# Patient Record
Sex: FEMALE | Race: WHITE | NOT HISPANIC OR LATINO | Employment: FULL TIME | URBAN - METROPOLITAN AREA
[De-identification: names, ages, dates, MRNs, and addresses within clinical notes are randomized per-mention and may not be internally consistent; named-entity substitution may affect disease eponyms.]

---

## 2018-11-28 ENCOUNTER — TELEPHONE (OUTPATIENT)
Dept: GASTROENTEROLOGY | Facility: CLINIC | Age: 23
End: 2018-11-28

## 2018-11-28 NOTE — TELEPHONE ENCOUNTER
----- Message from Lexie Maddox sent at 11/28/2018  4:13 PM CST -----  Contact: [facility] Coleman Aguilar and staff,     Dr Nahum Hartman is referreing this pt to see you for a dx of Crohns    The refrl and records are in media mgr.     Can you plz review and ctc the pt to schedule?     Thank you,  Lexie Maddox   [facility]    [phone]

## 2018-11-30 ENCOUNTER — TELEPHONE (OUTPATIENT)
Dept: GASTROENTEROLOGY | Facility: CLINIC | Age: 23
End: 2018-11-30

## 2018-12-04 ENCOUNTER — TELEPHONE (OUTPATIENT)
Dept: GASTROENTEROLOGY | Facility: CLINIC | Age: 23
End: 2018-12-04

## 2018-12-04 NOTE — TELEPHONE ENCOUNTER
----- Message from Promise Robbins MA sent at 12/4/2018  1:41 PM CST -----  Contact: [phone]  Needs Advice    Reason for call: Needing to reschedule her appt on 12/11/18        Communication Preference: [phone]    Additional Information: please call
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
## 2019-01-17 ENCOUNTER — LAB VISIT (OUTPATIENT)
Dept: LAB | Facility: HOSPITAL | Age: 24
End: 2019-01-17
Attending: INTERNAL MEDICINE
Payer: COMMERCIAL

## 2019-01-17 ENCOUNTER — OFFICE VISIT (OUTPATIENT)
Dept: GASTROENTEROLOGY | Facility: CLINIC | Age: 24
End: 2019-01-17
Payer: COMMERCIAL

## 2019-01-17 ENCOUNTER — TELEPHONE (OUTPATIENT)
Dept: ENDOSCOPY | Facility: HOSPITAL | Age: 24
End: 2019-01-17

## 2019-01-17 VITALS
WEIGHT: 139.56 LBS | HEART RATE: 72 BPM | TEMPERATURE: 98 F | DIASTOLIC BLOOD PRESSURE: 76 MMHG | HEIGHT: 66 IN | BODY MASS INDEX: 22.43 KG/M2 | SYSTOLIC BLOOD PRESSURE: 115 MMHG

## 2019-01-17 DIAGNOSIS — K50.119 CROHN'S DISEASE OF COLON WITH COMPLICATION: Primary | ICD-10-CM

## 2019-01-17 DIAGNOSIS — K50.119 CROHN'S DISEASE OF COLON WITH COMPLICATION: ICD-10-CM

## 2019-01-17 DIAGNOSIS — Z12.11 SPECIAL SCREENING FOR MALIGNANT NEOPLASMS, COLON: Primary | ICD-10-CM

## 2019-01-17 LAB
25(OH)D3+25(OH)D2 SERPL-MCNC: 23 NG/ML
ALBUMIN SERPL BCP-MCNC: 4.3 G/DL
ALP SERPL-CCNC: 66 U/L
ALT SERPL W/O P-5'-P-CCNC: 16 U/L
ANION GAP SERPL CALC-SCNC: 8 MMOL/L
AST SERPL-CCNC: 20 U/L
BASOPHILS # BLD AUTO: 0.04 K/UL
BASOPHILS NFR BLD: 0.5 %
BILIRUB SERPL-MCNC: 0.2 MG/DL
BUN SERPL-MCNC: 16 MG/DL
CALCIUM SERPL-MCNC: 9.5 MG/DL
CHLORIDE SERPL-SCNC: 104 MMOL/L
CO2 SERPL-SCNC: 27 MMOL/L
CREAT SERPL-MCNC: 0.8 MG/DL
CRP SERPL-MCNC: 2.2 MG/L
DIFFERENTIAL METHOD: ABNORMAL
EOSINOPHIL # BLD AUTO: 0.1 K/UL
EOSINOPHIL NFR BLD: 1.6 %
ERYTHROCYTE [DISTWIDTH] IN BLOOD BY AUTOMATED COUNT: 12.8 %
ERYTHROCYTE [SEDIMENTATION RATE] IN BLOOD BY WESTERGREN METHOD: <2 MM/HR
EST. GFR  (AFRICAN AMERICAN): >60 ML/MIN/1.73 M^2
EST. GFR  (NON AFRICAN AMERICAN): >60 ML/MIN/1.73 M^2
FERRITIN SERPL-MCNC: 75 NG/ML
GLUCOSE SERPL-MCNC: 72 MG/DL
HCT VFR BLD AUTO: 45 %
HGB BLD-MCNC: 14.8 G/DL
IGA SERPL-MCNC: 100 MG/DL
IMM GRANULOCYTES # BLD AUTO: 0.02 K/UL
IMM GRANULOCYTES NFR BLD AUTO: 0.3 %
IRON SERPL-MCNC: 104 UG/DL
LYMPHOCYTES # BLD AUTO: 2.8 K/UL
LYMPHOCYTES NFR BLD: 36.8 %
MCH RBC QN AUTO: 31.9 PG
MCHC RBC AUTO-ENTMCNC: 32.9 G/DL
MCV RBC AUTO: 97 FL
MONOCYTES # BLD AUTO: 0.5 K/UL
MONOCYTES NFR BLD: 6.7 %
NEUTROPHILS # BLD AUTO: 4.1 K/UL
NEUTROPHILS NFR BLD: 54.1 %
NRBC BLD-RTO: 0 /100 WBC
PLATELET # BLD AUTO: 310 K/UL
PMV BLD AUTO: 9.8 FL
POTASSIUM SERPL-SCNC: 3.8 MMOL/L
PROT SERPL-MCNC: 8.1 G/DL
RBC # BLD AUTO: 4.64 M/UL
SATURATED IRON: 26 %
SODIUM SERPL-SCNC: 139 MMOL/L
TOTAL IRON BINDING CAPACITY: 398 UG/DL
TRANSFERRIN SERPL-MCNC: 269 MG/DL
VIT B12 SERPL-MCNC: 577 PG/ML
WBC # BLD AUTO: 7.59 K/UL

## 2019-01-17 PROCEDURE — 86803 HEPATITIS C AB TEST: CPT

## 2019-01-17 PROCEDURE — 99999 PR PBB SHADOW E&M-EST. PATIENT-LVL IV: ICD-10-PCS | Mod: PBBFAC,,, | Performed by: INTERNAL MEDICINE

## 2019-01-17 PROCEDURE — 82607 VITAMIN B-12: CPT

## 2019-01-17 PROCEDURE — 86704 HEP B CORE ANTIBODY TOTAL: CPT

## 2019-01-17 PROCEDURE — 85652 RBC SED RATE AUTOMATED: CPT

## 2019-01-17 PROCEDURE — 82784 ASSAY IGA/IGD/IGG/IGM EACH: CPT

## 2019-01-17 PROCEDURE — 82657 ENZYME CELL ACTIVITY: CPT

## 2019-01-17 PROCEDURE — 87340 HEPATITIS B SURFACE AG IA: CPT

## 2019-01-17 PROCEDURE — 99999 PR PBB SHADOW E&M-EST. PATIENT-LVL IV: CPT | Mod: PBBFAC,,, | Performed by: INTERNAL MEDICINE

## 2019-01-17 PROCEDURE — 82728 ASSAY OF FERRITIN: CPT

## 2019-01-17 PROCEDURE — 86140 C-REACTIVE PROTEIN: CPT

## 2019-01-17 PROCEDURE — 99245 OFF/OP CONSLTJ NEW/EST HI 55: CPT | Mod: S$GLB,,, | Performed by: INTERNAL MEDICINE

## 2019-01-17 PROCEDURE — 86706 HEP B SURFACE ANTIBODY: CPT

## 2019-01-17 PROCEDURE — 83516 IMMUNOASSAY NONANTIBODY: CPT

## 2019-01-17 PROCEDURE — 36415 COLL VENOUS BLD VENIPUNCTURE: CPT

## 2019-01-17 PROCEDURE — 80053 COMPREHEN METABOLIC PANEL: CPT

## 2019-01-17 PROCEDURE — 85025 COMPLETE CBC W/AUTO DIFF WBC: CPT

## 2019-01-17 PROCEDURE — 99245 PR OFFICE CONSULTATION,LEVEL V: ICD-10-PCS | Mod: S$GLB,,, | Performed by: INTERNAL MEDICINE

## 2019-01-17 PROCEDURE — 86787 VARICELLA-ZOSTER ANTIBODY: CPT

## 2019-01-17 PROCEDURE — 86790 VIRUS ANTIBODY NOS: CPT

## 2019-01-17 PROCEDURE — 83540 ASSAY OF IRON: CPT

## 2019-01-17 PROCEDURE — 86703 HIV-1/HIV-2 1 RESULT ANTBDY: CPT

## 2019-01-17 PROCEDURE — 82306 VITAMIN D 25 HYDROXY: CPT

## 2019-01-17 RX ORDER — POLYETHYLENE GLYCOL 3350, SODIUM SULFATE ANHYDROUS, SODIUM BICARBONATE, SODIUM CHLORIDE, POTASSIUM CHLORIDE 236; 22.74; 6.74; 5.86; 2.97 G/4L; G/4L; G/4L; G/4L; G/4L
4 POWDER, FOR SOLUTION ORAL ONCE
Qty: 4000 ML | Refills: 0 | Status: SHIPPED | OUTPATIENT
Start: 2019-01-17 | End: 2019-01-17

## 2019-01-17 RX ORDER — FOLIC ACID 1 MG/1
1 TABLET ORAL DAILY
COMMUNITY

## 2019-01-17 RX ORDER — USTEKINUMAB 90 MG/ML
90 INJECTION, SOLUTION SUBCUTANEOUS
COMMUNITY

## 2019-01-17 RX ORDER — RIBOFLAVIN (VITAMIN B2) 25 MG
400 TABLET ORAL DAILY
COMMUNITY

## 2019-01-17 RX ORDER — AMOXICILLIN 500 MG
2 CAPSULE ORAL DAILY
COMMUNITY

## 2019-01-17 RX ORDER — NORETHINDRONE ACETATE AND ETHINYL ESTRADIOL 1MG-20(21)
1 KIT ORAL DAILY
COMMUNITY

## 2019-01-17 RX ORDER — FERROUS SULFATE 325(65) MG
325 TABLET ORAL
COMMUNITY

## 2019-01-17 RX ORDER — FLUOXETINE 10 MG/1
10 CAPSULE ORAL DAILY
COMMUNITY

## 2019-01-17 NOTE — PATIENT INSTRUCTIONS
Tests ordered during this visit:  Stool studies and Cdiff testing  Stool calprotectin  Routine blood tests including CBC, CMP and CRP, ESR  Blood test to check vitamin deficiencies.   Labs can be done at any [facility] facility  Colonoscopy to assess disease activity - can be done with primary GI    Medication changes:  No changes to your current medications. Please continue medications as previously instructed.     Preventative testing/exams  Recommend yearly skin exams by dermatology (increased risk for skin cancers)  Recommend yearly eye exam  Recommend yearly well women exams and PAP smear    Vaccinations:  Please send us an update and if able to, provide a copy of your previous vaccination records.  Recommended vaccinations if not done yet.   HPV  Tdap  Influenza  Pneumonia  Hepatitis A/B     Follow up visit:  Follow up in a week from now after colonoscopy - same day    Email/call IBD clinic:  Email/call IBD in clinic if worsening symptoms.     Methotrexate:  - methotrexate can decrease your folic acid--take folic acid 1 mg daily  - need to order Fibroscan of 2 gm of MTX--approximately 2 years after start    Common SE:   ?Gastrointestinal problems, such as nausea, stomach upset, and loose stools  ?Stomatitis or soreness of the mouth  ?Abnormal liver chemistries   ?Kidney problems  ?Rash- photosensivity- wear sunblock  ?Headache, fatigue, or impaired ability to concentrate  ?Hair loss- rare  ?Fever, which is drug-related  ?Hematologic abnormalities- abnormal blood counts

## 2019-01-17 NOTE — LETTER
January 18, 2019      Nahum Hartman MD  [address] [place] [address] [address]  [facility] [address]           [facility] Gastroenterology  [address]  [place] [address]  Phone: [phone]  Fax: [phone]          Patient: Rachelle Bunch   MR Number: [id number]   YOB: 1995   Date of Visit: 1/17/2019       Dear Dr. Nahum Hartman:    Thank you for referring Rachelle Bunch to me for evaluation. Attached you will find relevant portions of my assessment and plan of care.    If you have questions, please do not hesitate to call me. I look forward to following Rachelle Bunch along with you.    Sincerely,    Galo Aguilar MD    Enclosure  CC:  No Recipients    If you would like to receive this communication electronically, please contact [email or web address] or [phone] to request more information on [facility] access.    For providers and/or their staff who would like to refer a patient to [facility], please contact us through our one-stop-shop provider referral line, [facility], at [phone].    If you feel you have received this communication in error or would no longer like to receive these types of communications, please e-mail [email or web address]

## 2019-01-18 PROBLEM — K50.119 CROHN'S DISEASE OF COLON WITH COMPLICATION: Status: ACTIVE | Noted: 2019-01-18

## 2019-01-18 LAB
HBV CORE AB SERPL QL IA: NEGATIVE
HBV SURFACE AB SER-ACNC: NEGATIVE M[IU]/ML
HBV SURFACE AG SERPL QL IA: NEGATIVE
HCV AB SERPL QL IA: NEGATIVE
HEPATITIS A ANTIBODY, IGG: POSITIVE
HIV 1+2 AB+HIV1 P24 AG SERPL QL IA: NEGATIVE

## 2019-01-19 LAB
VARICELLA INTERPRETATION: POSITIVE
VARICELLA ZOSTER IGG: 3.75 ISR

## 2019-01-21 LAB — TTG IGA SER-ACNC: 4 UNITS

## 2019-01-24 ENCOUNTER — HOSPITAL ENCOUNTER (OUTPATIENT)
Facility: HOSPITAL | Age: 24
Discharge: HOME | End: 2019-01-24
Attending: INTERNAL MEDICINE | Admitting: INTERNAL MEDICINE
Payer: COMMERCIAL

## 2019-01-24 ENCOUNTER — ANESTHESIA EVENT (OUTPATIENT)
Dept: ENDOSCOPY | Facility: HOSPITAL | Age: 24
End: 2019-01-24
Payer: COMMERCIAL

## 2019-01-24 ENCOUNTER — ANESTHESIA (OUTPATIENT)
Dept: ENDOSCOPY | Facility: HOSPITAL | Age: 24
End: 2019-01-24
Payer: COMMERCIAL

## 2019-01-24 VITALS
RESPIRATION RATE: 16 BRPM | SYSTOLIC BLOOD PRESSURE: 103 MMHG | DIASTOLIC BLOOD PRESSURE: 67 MMHG | BODY MASS INDEX: 20.89 KG/M2 | OXYGEN SATURATION: 100 % | TEMPERATURE: 98 F | HEART RATE: 76 BPM | HEIGHT: 66 IN | WEIGHT: 130 LBS

## 2019-01-24 DIAGNOSIS — K50.119 CROHN'S DISEASE OF COLON WITH COMPLICATION: Primary | ICD-10-CM

## 2019-01-24 DIAGNOSIS — K50.111 CROHN'S DISEASE OF COLON WITH RECTAL BLEEDING: Primary | ICD-10-CM

## 2019-01-24 DIAGNOSIS — K50.10 CROHN'S DISEASE, COLON: ICD-10-CM

## 2019-01-24 LAB
6-METHYLMERCAPTOPURINE RIBOSIDE: 6.14 NMOL/ML/H (ref 5.04–9.57)
6-METHYLMERCAPTOPURINE: 3.2 NMOL/ML/H (ref 3–6.66)
6-METHYLTHIOGUANINE RIBOSIDE: 3.97 NMOL/ML/H (ref 2.7–5.84)
B-HCG UR QL: NEGATIVE
CTP QC/QA: YES
TPMT INTERPRETATION: NORMAL
TPMT REVIEWED BY: NORMAL

## 2019-01-24 PROCEDURE — 37000009 HC ANESTHESIA EA ADD 15 MINS: Performed by: INTERNAL MEDICINE

## 2019-01-24 PROCEDURE — 81025 URINE PREGNANCY TEST: CPT | Performed by: INTERNAL MEDICINE

## 2019-01-24 PROCEDURE — 37000008 HC ANESTHESIA 1ST 15 MINUTES: Performed by: INTERNAL MEDICINE

## 2019-01-24 PROCEDURE — 25000003 PHARM REV CODE 250: Performed by: INTERNAL MEDICINE

## 2019-01-24 PROCEDURE — 88305 TISSUE SPECIMEN TO PATHOLOGY - SURGERY: ICD-10-PCS | Mod: 26,,, | Performed by: PATHOLOGY

## 2019-01-24 PROCEDURE — E9220 PRA ENDO ANESTHESIA: ICD-10-PCS | Mod: ,,, | Performed by: NURSE ANESTHETIST, CERTIFIED REGISTERED

## 2019-01-24 PROCEDURE — 45380 COLONOSCOPY AND BIOPSY: CPT | Performed by: INTERNAL MEDICINE

## 2019-01-24 PROCEDURE — 27201012 HC FORCEPS, HOT/COLD, DISP: Performed by: INTERNAL MEDICINE

## 2019-01-24 PROCEDURE — 45380 COLONOSCOPY AND BIOPSY: CPT | Mod: ,,, | Performed by: INTERNAL MEDICINE

## 2019-01-24 PROCEDURE — 88305 TISSUE EXAM BY PATHOLOGIST: CPT | Mod: 26,,, | Performed by: PATHOLOGY

## 2019-01-24 PROCEDURE — 63600175 PHARM REV CODE 636 W HCPCS: Performed by: NURSE ANESTHETIST, CERTIFIED REGISTERED

## 2019-01-24 PROCEDURE — 45380 PR COLONOSCOPY,BIOPSY: ICD-10-PCS | Mod: ,,, | Performed by: INTERNAL MEDICINE

## 2019-01-24 PROCEDURE — 88305 TISSUE EXAM BY PATHOLOGIST: CPT | Performed by: PATHOLOGY

## 2019-01-24 PROCEDURE — E9220 PRA ENDO ANESTHESIA: HCPCS | Mod: ,,, | Performed by: NURSE ANESTHETIST, CERTIFIED REGISTERED

## 2019-01-24 RX ORDER — LIDOCAINE HCL/PF 100 MG/5ML
SYRINGE (ML) INTRAVENOUS
Status: DISCONTINUED | OUTPATIENT
Start: 2019-01-24 | End: 2019-01-24

## 2019-01-24 RX ORDER — SODIUM CHLORIDE 0.9 % (FLUSH) 0.9 %
3 SYRINGE (ML) INJECTION
Status: DISCONTINUED | OUTPATIENT
Start: 2019-01-24 | End: 2019-01-24 | Stop reason: HOSPADM

## 2019-01-24 RX ORDER — SODIUM CHLORIDE 9 MG/ML
INJECTION, SOLUTION INTRAVENOUS CONTINUOUS
Status: DISCONTINUED | OUTPATIENT
Start: 2019-01-24 | End: 2019-01-24 | Stop reason: HOSPADM

## 2019-01-24 RX ORDER — PROPOFOL 10 MG/ML
VIAL (ML) INTRAVENOUS CONTINUOUS PRN
Status: DISCONTINUED | OUTPATIENT
Start: 2019-01-24 | End: 2019-01-24

## 2019-01-24 RX ORDER — MESALAMINE 1000 MG/1
1000 SUPPOSITORY RECTAL NIGHTLY
Qty: 30 SUPPOSITORY | Refills: 5 | Status: SHIPPED | OUTPATIENT
Start: 2019-01-24 | End: 2019-01-29

## 2019-01-24 RX ORDER — PROPOFOL 10 MG/ML
VIAL (ML) INTRAVENOUS
Status: DISCONTINUED | OUTPATIENT
Start: 2019-01-24 | End: 2019-01-24

## 2019-01-24 RX ADMIN — SODIUM CHLORIDE: 0.9 INJECTION, SOLUTION INTRAVENOUS at 07:01

## 2019-01-24 RX ADMIN — PROPOFOL 150 MCG/KG/MIN: 10 INJECTION, EMULSION INTRAVENOUS at 07:01

## 2019-01-24 RX ADMIN — LIDOCAINE HYDROCHLORIDE 40 MG: 20 INJECTION, SOLUTION INTRAVENOUS at 07:01

## 2019-01-24 RX ADMIN — PROPOFOL 50 MG: 10 INJECTION, EMULSION INTRAVENOUS at 08:01

## 2019-01-24 RX ADMIN — PROPOFOL 100 MG: 10 INJECTION, EMULSION INTRAVENOUS at 07:01

## 2019-01-24 RX ADMIN — PROPOFOL 50 MG: 10 INJECTION, EMULSION INTRAVENOUS at 07:01

## 2019-01-24 NOTE — DISCHARGE INSTRUCTIONS
Colonoscopy     A camera attached to a flexible tube with a viewing lens is used to take video pictures.     Colonoscopy is a test to view the inside of your lower digestive tract (colon and rectum). Sometimes it can show the last part of the small intestine (ileum). During the test, small pieces of tissue may be removed for testing. This is called a biopsy. Small growths, such as polyps, may also be removed.   Why is colonoscopy done?  The test is done to help look for colon cancer. And it can help find the source of abdominal pain, bleeding, and changes in bowel habits. It may be needed once a year, depending on factors such as your:  · Age  · Health history  · Family health history  · Symptoms  · Results from any prior colonoscopy  Risks and possible complications  These include:  · Bleeding               · A puncture or tear in the colon   · Risks of anesthesia  · A cancer lesion not being seen  Getting ready   To prepare for the test:  · Talk with your healthcare provider about the risks of the test (see below). Also ask your healthcare provider about alternatives to the test.  · Tell your healthcare provider about any medicines you take. Also tell him or her about any health conditions you may have.  · Make sure your rectum and colon are empty for the test. Follow the diet and bowel prep instructions exactly. If you dont, the test may need to be rescheduled.  · Plan for a friend or family member to drive you home after the test.     Colonoscopy provides an inside view of the entire colon.     You may discuss the results with your doctor right away or at a future visit.  During the test   The test is usually done in the hospital on an outpatient basis. This means you go home the same day. The procedure takes about 30 minutes. During that time:  · You are given relaxing (sedating) medicine through an IV line. You may be drowsy, or fully asleep.  · The healthcare provider will first give you a physical exam to  check for anal and rectal problems.  · Then the anus is lubricated and the scope inserted.  · If you are awake, you may have a feeling similar to needing to have a bowel movement. You may also feel pressure as air is pumped into the colon. Its OK to pass gas during the procedure.  · Biopsy, polyp removal, or other treatments may be done during the test.  After the test   You may have gas right after the test. It can help to try to pass it to help prevent later bloating. Your healthcare provider may discuss the results with you right away. Or you may need to schedule a follow-up visit to talk about the results. After the test, you can go back to your normal eating and other activities. You may be tired from the sedation and need to rest for a few hours.  Date Last Reviewed: 11/1/2016 © 2000-2017 [employer]. [address] [place] [address], [place] [address]. All rights reserved. This information is not intended as a substitute for professional medical care. Always follow your healthcare professional's instructions.

## 2019-01-24 NOTE — ANESTHESIA POSTPROCEDURE EVALUATION
"Anesthesia Post Evaluation    Patient: Rachelle Bunch    Procedure(s) Performed: Procedure(s) (LRB):  COLONOSCOPY (N/A)    Final Anesthesia Type: general  Patient location during evaluation: PACU  Patient participation: Yes- Able to Participate  Level of consciousness: awake and alert and oriented  Post-procedure vital signs: reviewed and stable  Pain management: adequate  Airway patency: patent  PONV status at discharge: No PONV  Anesthetic complications: no      Cardiovascular status: hemodynamically stable  Respiratory status: unassisted  Hydration status: euvolemic  Follow-up not needed.        Visit Vitals  BP 97/60 (BP Location: Left arm, Patient Position: Lying)   Pulse 67   Temp 36.7 °C (98.1 °F) (Temporal)   Resp 16   Ht 5' 6" (1.676 m)   Wt 59 kg (130 lb)   SpO2 100%   Breastfeeding? No   BMI 20.98 kg/m²       Pain/Lora Score: Lora Score: 9 (1/24/2019  8:41 AM)        "

## 2019-01-24 NOTE — PLAN OF CARE
Discharge instruction given to patient and family. Patient and family verbalized understanding. All questions and concerns addressed.

## 2019-01-24 NOTE — [employer] PATIENT INSTRUCTIONS
Discharge Summary/Instructions after an Endoscopic Procedure  Patient Name: Rachelle Bunch  Patient MRN: [id number]  Patient YOB: 1995 Thursday, January 24, 2019  Galo Aguilar MD  RESTRICTIONS:  During your procedure today, you received medications for sedation.  These   medications may affect your judgment, balance and coordination.  Therefore,   for 24 hours, you have the following restrictions:   - DO NOT drive a car, operate machinery, make legal/financial decisions,   sign important papers or drink alcohol.    ACTIVITY:  Today: no heavy lifting, straining or running due to procedural   sedation/anesthesia.  The following day: return to full activity including work.  DIET:  Eat and drink normally unless instructed otherwise.     TREATMENT FOR COMMON SIDE EFFECTS:  - Mild abdominal pain, nausea, belching, bloating or excessive gas:  rest,   eat lightly and use a heating pad.  - Sore Throat: treat with throat lozenges and/or gargle with warm salt   water.  - Because air was used during the procedure, expelling large amounts of air   from your rectum or belching is normal.  - If a bowel prep was taken, you may not have a bowel movement for 1-3 days.    This is normal.  SYMPTOMS TO WATCH FOR AND REPORT TO YOUR PHYSICIAN:  1. Abdominal pain or bloating, other than gas cramps.  2. Chest pain.  3. Back pain.  4. Signs of infection such as: chills or fever occurring within 24 hours   after the procedure.  5. Rectal bleeding, which would show as bright red, maroon, or black stools.   (A tablespoon of blood from the rectum is not serious, especially if   hemorrhoids are present.)  6. Vomiting.  7. Weakness or dizziness.  GO DIRECTLY TO THE NEAREST EMERGENCY ROOM IF YOU HAVE ANY OF THE FOLLOWING:      Difficulty breathing              Chills and/or fever over 101 F   Persistent vomiting and/or vomiting blood   Severe abdominal pain   Severe chest pain   Black, tarry stools   Bleeding- more than one  tablespoon   Any other symptom or condition that you feel may need urgent attention  Your doctor recommends these additional instructions:  If any biopsies were taken, your doctors clinic will contact you in 1 to 2   weeks with any results.  - Discharge patient to home.   - Patient has a contact number available for emergencies.  The signs and   symptoms of potential delayed complications were discussed with the   patient.  Return to normal activities tomorrow.  Written discharge   instructions were provided to the patient.   - Resume previous diet.   - Continue present medications.   - Await pathology results.   - Return to referring physician.   - Start canasa suppositories per rectum at bedtime.   - Repeat colonoscopy to be scheduled.   For questions, problems or results please call your physician - Galo Aguilar MD at Work:  [phone].  [facility], EMERGENCY ROOM PHONE NUMBER: [phone]  IF A COMPLICATION OR EMERGENCY SITUATION ARISES AND YOU ARE UNABLE TO REACH   YOUR PHYSICIAN - GO DIRECTLY TO THE EMERGENCY ROOM.  Galo Aguilar MD  1/24/2019 8:19:14 AM  This report has been verified and signed electronically.  [employer]

## 2019-01-24 NOTE — ANESTHESIA PREPROCEDURE EVALUATION
01/24/2019  Rachelle Bunch is a 23 y.o., female.    Past Medical History:   Diagnosis Date    Anemia 2015    It should be better now.    Crohn's disease     Duane syndrome of right eye     Migraine     Plantar callosity      Past Surgical History:   Procedure Laterality Date    COLONOSCOPY      NASAL SEPTUM SURGERY      TOE SURGERY      ingrown toenail procedure done under anesthesia    TONSILLECTOMY, ADENOIDECTOMY      UPPER GASTROINTESTINAL ENDOSCOPY      WISDOM TOOTH EXTRACTION         Anesthesia Evaluation     I have reviewed the Nursing Notes.   I have reviewed the Medications.     Review of Systems  Anesthesia Hx:  No problems with previous Anesthesia   Social:  Non-Smoker, No Alcohol Use    Neurological:   Headaches        Physical Exam  General:  Well nourished    Airway/Jaw/Neck:  Airway Findings: Mouth Opening: Normal Tongue: Normal  General Airway Assessment: Adult  Mallampati: II  Improves to II, I with phonation.  TM Distance: Normal, at least 6 cm      Dental:  Dental Findings: In tact        Mental Status:  Mental Status Findings:  Cooperative, Alert and Oriented         Anesthesia Plan  Type of Anesthesia, risks & benefits discussed:  Anesthesia Type:  general  Patient's Preference: general  Intra-op Monitoring Plan: standard ASA monitors  Intra-op Monitoring Plan Comments:   Post Op Pain Control Plan: IV/PO Opioids PRN  Post Op Pain Control Plan Comments:   Induction:   IV  Beta Blocker:  Patient is not currently on a Beta-Blocker (No further documentation required).       Informed Consent: Patient understands risks and agrees with Anesthesia plan.  Questions answered. Anesthesia consent signed with patient.  ASA Score: 2     Day of Surgery Review of History & Physical:  There are no significant changes.  H&P update referred to the surgeon.         Ready For Surgery From Anesthesia  Perspective.

## 2019-01-24 NOTE — TRANSFER OF CARE
"Anesthesia Transfer of Care Note    Patient: Rachelle Bunch    Procedure(s) Performed: Procedure(s) (LRB):  COLONOSCOPY (N/A)    Patient location: OPS    Anesthesia Type: general    Transport from OR: Transported from OR on room air with adequate spontaneous ventilation    Post pain: adequate analgesia    Post assessment: no apparent anesthetic complications and tolerated procedure well    Post vital signs: stable    Level of consciousness: awake, alert and oriented    Nausea/Vomiting: no nausea/vomiting    Complications: none    Transfer of care protocol was followed      Last vitals:   Visit Vitals  /73   Pulse 82   Temp 37.4 °C (99.3 °F)   Resp 18   Ht 5' 6" (1.676 m)   Wt 59 kg (130 lb)   SpO2 99%   Breastfeeding? No   BMI 20.98 kg/m²     "

## 2019-01-25 ENCOUNTER — PATIENT MESSAGE (OUTPATIENT)
Dept: GASTROENTEROLOGY | Facility: CLINIC | Age: 24
End: 2019-01-25

## 2019-01-25 LAB — CALPROTECTIN STL-MCNT: 43 UG/G (ref 0–120)

## 2019-01-28 NOTE — TELEPHONE ENCOUNTER
Canasa is on hold.    As per Pharmacy, Brand medication was ordered, and is not covered by insurance.    Medication needs to be reordered as brand only: GALLITO 1

## 2019-01-29 DIAGNOSIS — K50.111 CROHN'S DISEASE OF COLON WITH RECTAL BLEEDING: ICD-10-CM

## 2019-01-29 RX ORDER — MESALAMINE 1000 MG/1
1000 SUPPOSITORY RECTAL NIGHTLY
Qty: 30 SUPPOSITORY | Refills: 5 | Status: SHIPPED | OUTPATIENT
Start: 2019-01-29 | End: 2019-02-28

## 2019-01-31 ENCOUNTER — TELEPHONE (OUTPATIENT)
Dept: ENDOSCOPY | Facility: HOSPITAL | Age: 24
End: 2019-01-31

## 2019-01-31 ENCOUNTER — PATIENT MESSAGE (OUTPATIENT)
Dept: GASTROENTEROLOGY | Facility: CLINIC | Age: 24
End: 2019-01-31

## 2019-02-05 ENCOUNTER — TELEPHONE (OUTPATIENT)
Dept: GASTROENTEROLOGY | Facility: CLINIC | Age: 24
End: 2019-02-05

## 2019-02-05 NOTE — TELEPHONE ENCOUNTER
----- Message from Rosalind Ricks sent at 2/5/2019  2:30 PM CST -----  Contact: [facility]- [phone]  Lauren- pharmacy called and stated a PA is needed for the rx mesalamine (CANASA) 1000 MG Supp due to the cost- please contact pharmacy at [phone]

## 2019-02-05 NOTE — TELEPHONE ENCOUNTER
Error code: Cost Exceeds Maximum.    Pt's pharmacy will call pharmacy help desk to see if this issue can be resolved.

## 2019-02-06 ENCOUNTER — PATIENT MESSAGE (OUTPATIENT)
Dept: GASTROENTEROLOGY | Facility: CLINIC | Age: 24
End: 2019-02-06

## 2019-02-08 ENCOUNTER — PATIENT MESSAGE (OUTPATIENT)
Dept: GASTROENTEROLOGY | Facility: CLINIC | Age: 24
End: 2019-02-08

## 2019-02-08 NOTE — TELEPHONE ENCOUNTER
Called pt and updated on attempt to approve Canasa.     Advised pt to inform IBD clinic if she hears from pharmacy/insurance company.

## 2019-02-11 ENCOUNTER — PATIENT MESSAGE (OUTPATIENT)
Dept: GASTROENTEROLOGY | Facility: CLINIC | Age: 24
End: 2019-02-11

## 2019-02-11 NOTE — TELEPHONE ENCOUNTER
Fax received, stating that Canasa suppositories are not covered, since pt does not have prescription drug coverage with [employer].

## 2019-02-11 NOTE — TELEPHONE ENCOUNTER
Called Rachelle & confirmed that all of her medication is covered by [employer], except for [facility], which covers Stelara.

## 2019-02-11 NOTE — TELEPHONE ENCOUNTER
Spoke to Dulce @ [employer]- PA for Noamasa needs to be turned in to [employer].    Form will be faxed to IBD fax.

## 2019-02-12 NOTE — TELEPHONE ENCOUNTER
'Precertification Request Form- Prescription Drug' faxed to [employer].     Confirmation Received Via E-mail.

## 2019-02-14 ENCOUNTER — OFFICE VISIT (OUTPATIENT)
Dept: GASTROENTEROLOGY | Facility: CLINIC | Age: 24
End: 2019-02-14
Payer: COMMERCIAL

## 2019-02-14 VITALS
HEART RATE: 75 BPM | TEMPERATURE: 99 F | SYSTOLIC BLOOD PRESSURE: 113 MMHG | HEIGHT: 66 IN | BODY MASS INDEX: 22.43 KG/M2 | DIASTOLIC BLOOD PRESSURE: 77 MMHG | WEIGHT: 139.56 LBS

## 2019-02-14 DIAGNOSIS — G43.009 MIGRAINE WITHOUT AURA AND WITHOUT STATUS MIGRAINOSUS, NOT INTRACTABLE: ICD-10-CM

## 2019-02-14 DIAGNOSIS — K50.119 CROHN'S DISEASE OF COLON WITH COMPLICATION: Primary | ICD-10-CM

## 2019-02-14 PROCEDURE — 99999 PR PBB SHADOW E&M-EST. PATIENT-LVL IV: ICD-10-PCS | Mod: PBBFAC,,, | Performed by: INTERNAL MEDICINE

## 2019-02-14 PROCEDURE — 99999 PR PBB SHADOW E&M-EST. PATIENT-LVL IV: CPT | Mod: PBBFAC,,, | Performed by: INTERNAL MEDICINE

## 2019-02-14 PROCEDURE — 3008F PR BODY MASS INDEX (BMI) DOCUMENTED: ICD-10-PCS | Mod: CPTII,S$GLB,, | Performed by: INTERNAL MEDICINE

## 2019-02-14 PROCEDURE — 99215 OFFICE O/P EST HI 40 MIN: CPT | Mod: S$GLB,,, | Performed by: INTERNAL MEDICINE

## 2019-02-14 PROCEDURE — 99215 PR OFFICE/OUTPT VISIT, EST, LEVL V, 40-54 MIN: ICD-10-PCS | Mod: S$GLB,,, | Performed by: INTERNAL MEDICINE

## 2019-02-14 PROCEDURE — 3008F BODY MASS INDEX DOCD: CPT | Mod: CPTII,S$GLB,, | Performed by: INTERNAL MEDICINE

## 2019-02-14 RX ORDER — AMITRIPTYLINE HYDROCHLORIDE 10 MG/1
10 TABLET, FILM COATED ORAL NIGHTLY PRN
Qty: 60 TABLET | Refills: 0 | Status: SHIPPED | OUTPATIENT
Start: 2019-02-14 | End: 2020-02-14

## 2019-02-14 NOTE — PATIENT INSTRUCTIONS
Tests ordered during this visit:  No blood tests today     Medication changes:  Please continue medications as previously instructed.   Starting a new medication - Elavil 10 mg once daily at night for headaches. Please stop exedrin.  Start canasa tomorrow.    Follow up visit:  Follow up with Dr. Hartman.     Email/call IBD clinic:  Email/call IBD in clinic in 2 weeks if worsening sx.    Amitriptyline tablets  What is this medicine?  AMITRIPTYLINE (a kathryn TRIP ti claudia) is used to treat depression.  How should I use this medicine?  Take this medicine by mouth with a drink of water. Follow the directions on the prescription label. You can take the tablets with or without food. Take your medicine at regular intervals. Do not take it more often than directed. Do not stop taking this medicine suddenly except upon the advice of your doctor. Stopping this medicine too quickly may cause serious side effects or your condition may worsen.  A special MedGuide will be given to you by the pharmacist with each prescription and refill. Be sure to read this information carefully each time.  Talk to your pediatrician regarding the use of this medicine in children. Special care may be needed.  What side effects may I notice from receiving this medicine?  Side effects that you should report to your doctor or health care professional as soon as possible:  · allergic reactions like skin rash, itching or hives, swelling of the face, lips, or tongue  · abnormal production of milk in females  · breast enlargement in both males and females  · breathing problems  · confusion, hallucinations  · fast, irregular heartbeat  · fever with increased sweating  · muscle stiffness, or spasms  · pain or difficulty passing urine, loss of bladder control  · seizures  · suicidal thoughts or other mood changes  · swelling of the testicles  · tingling, pain, or numbness in the feet or hands  · yellowing of the eyes or skin  Side effects that usually do not  require medical attention (report to your doctor or health care professional if they continue or are bothersome):  · change in sex drive or performance  · constipation or diarrhea  · nausea, vomiting  · weight gain or loss  What may interact with this medicine?  Do not take this medicine with any of the following medications:  · arsenic trioxide  · certain medicines used to regulate abnormal heartbeat or to treat other heart conditions  · cisapride  · droperidol  · halofantrine  · linezolid  · MAOIs like Carbex, Eldepryl, Marplan, Nardil, and Parnate  · methylene blue  · other medicines for mental depression  · phenothiazines like perphenazine, thioridazine and chlorpromazine  · pimozide  · probucol  · procarbazine  · sparfloxacin  · Andreina's Wort  · ziprasidone  This medicine may also interact with the following medications:  · atropine and related drugs like hyoscyamine, scopolamine, tolterodine and others  · barbiturate medicines for inducing sleep or treating seizures, like phenobarbital  · cimetidine  · disulfiram  · ethchlorvynol  · thyroid hormones such as levothyroxine  What if I miss a dose?  If you miss a dose, take it as soon as you can. If it is almost time for your next dose, take only that dose. Do not take double or extra doses.  Where should I keep my medicine?  Keep out of the reach of children.  Store at room temperature between 20 and 25 degrees C (68 and 77 degrees F). Throw away any unused medicine after the expiration date.  What should I tell my health care provider before I take this medicine?  They need to know if you have any of these conditions:  · an alcohol problem  · asthma, difficulty breathing  · bipolar disorder or schizophrenia  · difficulty passing urine, prostate trouble  · glaucoma  · heart disease or previous heart attack  · liver disease  · over active thyroid  · seizures  · thoughts or plans of suicide, a previous suicide attempt, or family history of suicide attempt  · an  unusual or allergic reaction to amitriptyline, other medicines, foods, dyes, or preservatives  · pregnant or trying to get pregnant  · breast-feeding  What should I watch for while using this medicine?  Tell your doctor if your symptoms do not get better or if they get worse. Visit your doctor or health care professional for regular checks on your progress. Because it may take several weeks to see the full effects of this medicine, it is important to continue your treatment as prescribed by your doctor.  Patients and their families should watch out for new or worsening thoughts of suicide or depression. Also watch out for sudden changes in feelings such as feeling anxious, agitated, panicky, irritable, hostile, aggressive, impulsive, severely restless, overly excited and hyperactive, or not being able to sleep. If this happens, especially at the beginning of treatment or after a change in dose, call your health care professional.  You may get drowsy or dizzy. Do not drive, use machinery, or do anything that needs mental alertness until you know how this medicine affects you. Do not stand or sit up quickly, especially if you are an older patient. This reduces the risk of dizzy or fainting spells. Alcohol may interfere with the effect of this medicine. Avoid alcoholic drinks.  Do not treat yourself for coughs, colds, or allergies without asking your doctor or health care professional for advice. Some ingredients can increase possible side effects.  Your mouth may get dry. Chewing sugarless gum or sucking hard candy, and drinking plenty of water will help. Contact your doctor if the problem does not go away or is severe.  This medicine may cause dry eyes and blurred vision. If you wear contact lenses you may feel some discomfort. Lubricating drops may help. See your eye doctor if the problem does not go away or is severe.  This medicine can cause constipation. Try to have a bowel movement at least every 2 to 3 days. If  you do not have a bowel movement for 3 days, call your doctor or health care professional.  This medicine can make you more sensitive to the sun. Keep out of the sun. If you cannot avoid being in the sun, wear protective clothing and use sunscreen. Do not use sun lamps or tanning beds/booths.  NOTE:This sheet is a summary. It may not cover all possible information. If you have questions about this medicine, talk to your doctor, pharmacist, or health care provider. Copyright© 2017 [employer]

## 2019-02-14 NOTE — TELEPHONE ENCOUNTER
Spoke to Sujatha @ [employer], PA is in the final stages of approval & will be available to pt by EOB today or tomorrow.

## 2019-02-14 NOTE — PROGRESS NOTES
[facility]             Inflammatory Bowel Disease Follow-up  Note              TODAY'S VISIT DATE:  2/15/2019    Chief Complaint:   Chief Complaint   Patient presents with    Crohn's Disease     of colon with complication      PCP: Primary Doctor No    Previous History:  Rachelle Bunch is a 23 y.o. female with Crohn's colitis who had initial onset of blood from rectum and diagnosed with hemorrhoids by flex sig 10/2013 though symptoms continued and she wasw found to be anemia incidentally in spring/early summer 2015.  Due to these issues  She had colonoscopy 11/2015 which was consistent with patchy colitis with diagnosis of Crohn's colitis.  She was induced with prednisone and maintained on lialda. IBD serology showed UC type phenotype. She did well and was able to taper off of prednisone initially but within a few months of stopping prednisone she had bloody diarrhea and started on remicade. Per the notes imuran was started with this but patient does not recall being on imuran and we called her pharmacy and they have no recordof this.  She had good response and was not on any prednisone ini 2016 but lose response to remicade in Jan 2018 after a normal colonoscopy in 12/2016.  Her last infusion of remicade was around 3/27/17.  Patient does not recall having any levels/abs drawn.  Colonoscopy 3/2018 showed active inflammation with extensive colitis.  She was started on oral uceris 9 mg daily and then was being considered for a clinical trial. Colonoscopy 5/1/18 showed active inflammation again and due to not having response to oral uceris and not qualifying for the trial she was started on prednisone 40 mg po daily while awaiting stelara approval. On 6/25/18 she had stool studies which were negative for infection with stool calprotectin of 122. She started MTX 15 mg po weekly with folic acid the end of June 2018 and received her infusion for stelara on 7/11/18 with then 90 mg SC every 8  weeks.  She did well initially except for some possible hair loss due to stelara which eventually resolved. Labs on 11/8/18 showed normal H/H, albumin and CRP and trough stelara levels were 5.6.  She was referred to the IBD clinic on 1/17/19 at which time she had  2 formed BMs/day but with blood and mucous drops one week prior. This also coincided with stress and lack of sleep. We proceeded with colonoscopy to evaluate the symptoms and tailor treatment on 1/24/19 which showed localized mild inflammation with mild erythema and punctate ulcers in the rectum and another area in the distal transverse colon.      Interval History:  - current IBD meds: stelara 90 mg SC every 8 weeks and MTX 15 mg po weekly.   - 2 formed Bowel Movements/day. Reports having on and off bright red blood with bowel movements.   1/24/19 colonoscopy: localized mild inflammation with mild erythema and punctate ulcers in the rectum and another area in the distal transverse colon.   - asked to start canasa but was not able to start as it was not approved by her insurance   - Sx has not changed since having colonoscopy  - Reported pink eye last week but has resolved. But continues to have dry non-productive cough  - NSAID use: Takes excedrin for migraine headaches.   - Narcotic use: no  - Alternative/complementary meds for IBD: no    Prior Pertinent Surgeries:   None     Pertinent Endoscopy/Imaging:  10/4/2013: flex sig: non-bleeding internal hemorrhoids, exam otherwise without abnormality   11/4/2015 Colonoscopy: normal appearing terminal (path: normal); patchy colitis noted distal to the hepatic flexure, skip areas noted with normal mucosa (path-transverse, sigmoid: active chronic colitis with ulceration and crypt abscesses)  12/13/2016 Colonoscopy: normal appearing terminal ileum; normal appearing cecum, ICV, and appendiceal orifice; ascending, transverse, descending, sigmoid, and rectum appeared unremarkable (no path)  3/20/2018 Colonoscopy:  abnormal mucosa that was edematous, erythematous, friable, ulcerated, and bleeding found in the rectum, sigmoid, and transverse colon (path-sigmoid: chronic and active colitis with mucosal ulceration) (path-rectum: diffuse chronic and active proctitis); normal appearing terminal ileum (path: normal)  5/1/2018 Colonoscopy: large sized diffuse abnormal mucosa that was friable, ulcerated, oozing blood, granularity, and loss of vascularity found in the rectum, sigmoid, and descending colon; normal appearing terminal ileum (no path provided, done with research study)    Pertinent Labs:  Lab Results   Component Value Date    SEDRATE <2 01/17/2019    CRP 2.2 01/17/2019     Lab Results   Component Value Date    TTGIGA 4 01/17/2019     01/17/2019     No results found for: TSH, FREET4  Lab Results   Component Value Date    SOQYGNXE57YQ 23 (L) 01/17/2019    VVJUBNQF93 577 01/17/2019     Lab Results   Component Value Date    HEPBSAG Negative 01/17/2019    HEPBCAB Negative 01/17/2019    HEPCAB Negative 01/17/2019     Lab Results   Component Value Date    [id number] Negative 01/17/2019     Lab Results   Component Value Date    NIL 0.040 01/17/2019    MITOGENNIL 8.270 01/17/2019     Lab Results   Component Value Date    TPTMINTERP SEE BELOW 01/17/2019     No results found for: STOOLCULTURE, AJDPKPEBZZ3M, MXYFOLPFGN7M, CDIFFICILEAN, CDIFFTOX, CDIFFICILEBY  No results found for: CALPROTECTIN    Therapeutic Drug Monitoring Labs:  11/8/2018: Trough Stelara Level 5.6, ABs not commented    Prior IBD Therapies:  Lialda 4.8 gm/day  Prednisone  Imuran 100 mg/day  Remicade (4/2016, stopped 3/2018)  Uceris 9 mg PO/day    Current IBD Therapies:  Stelara 90 mg SC every 8 weeks (infusion 7/11/2018, last injection thought to be the first week of Jan)  MTX 15 mg po weekly    Vaccinations:  Lab Results   Component Value Date    HEPBSAB Negative 01/17/2019     Lab Results   Component Value Date    HEPAIGG Positive (A) 01/17/2019     Lab  Results   Component Value Date    VARICELLAZOS 3.75 (H) 01/17/2019    VARICELLAINT Positive (A) 01/17/2019     Vaccinations:  Influenza (inactive): received 2019   Pneumococcal PCV 13: recommended   Pneumococcal PCV 23: recommended  Tetanus (TdaP): recommended every 10 years  HPV (males and females ages 19-25 yo):   Not sure if she took, recommended  Meningococcal (risk factors- complement component deficiency, spleen damage or splenectomy, HIV, traveling to endemic areas, college student residing in residence bell,  recruits):not sure    Hepatitis B: will check immunity to hep B    No results found for: HEPBSAB  Hepatitis A (risk factors- traveling to high endemic areas, chronic liver disease, clotting factor disorders, MSM, illicit drug users): will check immunity to hep A  No results found for: HEPAIGG  MMR (live vaccine):    Not sure  Chickenpox status/Varicella (live vaccine): VZV IgG to check immunity  No results found for: VARICELLAZOS, VARICELLAINT  Zoster (age >51 yo, live vaccine):  Recommended      Review of Systems   Constitutional: Negative for chills, fever and weight loss.   HENT:        No oral ulcers, dysphagia, oral thrush   Eyes: Positive for redness (resolved). Negative for blurred vision, double vision, photophobia, pain and discharge.   Respiratory: Positive for cough. Negative for shortness of breath.    Cardiovascular: Negative for chest pain.   Gastrointestinal: Negative for abdominal pain, heartburn, nausea and vomiting.   Genitourinary: Negative for dysuria and hematuria.   Musculoskeletal: Positive for back pain. Negative for joint pain.   Skin: Negative for rash.   Neurological: Positive for headaches.   Psychiatric/Behavioral: Negative for depression. The patient is nervous/anxious. The patient does not have insomnia.      All Medical History/Surgical History/Family History/Social History/Allergies have been reviewed and updated in EMR    Review of patient's allergies indicates:    Allergen Reactions    Penicillins Itching     Outpatient Medications Marked as Taking for the 2/14/19 encounter (Office Visit) with Galo Aguilar MD   Medication Sig Dispense Refill    aspirin-acetaminophen-caffeine 250-250-65 mg (EXCEDRIN EXTRA STRENGTH) 250-250-65 mg per tablet Take 2 tablets by mouth every 6 (six) hours as needed for Pain.      ferrous sulfate (FEOSOL) 325 mg (65 mg iron) Tab tablet Take 325 mg by mouth daily with breakfast.      fish oil-omega-3 fatty acids 300-1,000 mg capsule Take 2 capsules by mouth once daily.      FLUoxetine 10 MG capsule Take 10 mg by mouth once daily.      folic acid (FOLVITE) 1 MG tablet Take 1 mg by mouth once daily.      methotrexate 5 MG tablet Take 15 mg by mouth once a week.      norethindrone-ethinyl estradiol (JUNEL FE 1/20) 1 mg-20 mcg (21)/75 mg (7) per tablet Take 1 tablet by mouth once daily.      riboflavin, vitamin B2, (VITAMIN B-2) 25 mg Tab Take 400 mg by mouth once daily.      ustekinumab (STELARA) 90 mg/mL Syrg syringe Inject 90 mg into the skin every 8 weeks.         Vital Signs:  BP: 113/77 Temp: 98.5 °F (36.9 °C) Pulse: 75(O2: 99%)    Weight: 63.3 kg (139 lb 8.8 oz)    Physical Exam   Constitutional: She is oriented to person, place, and time. She appears well-developed and well-nourished.   HENT:   Mouth/Throat: Oropharynx is clear and moist. No oral lesions.   No oral ulcers or thrush   Eyes: Conjunctivae are normal. Pupils are equal, round, and reactive to light.   Cardiovascular: Normal rate and regular rhythm.   Pulmonary/Chest: Effort normal and breath sounds normal.   Abdominal: Soft. Bowel sounds are normal. She exhibits no distension and no mass. There is no tenderness. There is no rebound and no guarding.   Musculoskeletal:        Right lower leg: She exhibits no swelling.        Left lower leg: She exhibits no swelling.   Neurological: She is alert and oriented to person, place, and time.   Skin: No rash noted.   Psychiatric:  She has a normal mood and affect. Her behavior is normal.   Nursing note and vitals reviewed.      Labs:   Lab Results   Component Value Date    WBC 7.59 01/17/2019    HGB 14.8 01/17/2019    HCT 45.0 01/17/2019    MCV 97 01/17/2019     01/17/2019     Lab Results   Component Value Date    CREATININE 0.8 01/17/2019    ALBUMIN 4.3 01/17/2019    BILITOT 0.2 01/17/2019    ALKPHOS 66 01/17/2019    AST 20 01/17/2019    ALT 16 01/17/2019     Lab Results   Component Value Date    NIL 0.040 01/17/2019    MITOGENNIL 8.270 01/17/2019       Assessment/Plan:  Rachelle Bunch is a 23 y.o. female with Crohn's colitis (dx 10/2013) who is on stelara 90 mg SC every 8 weeks and MTX 15 mg po weekly. She had some blood in her stool at the time of her initial visit so we proceeded with colonoscopy on 1/24/19 which showed some mild proctitis and a 2nd localized area of inflammation in the transverse colon.  We recommended canasa suppositories with f/u 2 weeks later though her insurance did not approve canasa initially so she has not been any treatment for past 2 weeks with worsening rectal bleeding. It has now been approved and she should be able to start canasa soon. Her most labs are normal and she has normal stool calprotectin so don't think this will be a good marker to follow for now.  She will start canasa supp now that it is approved and though symptoms slightly worse we believe primary symptoms are related to proctitis and canasa should help. She will give me update in 2 weeks regarding response and see Dr. Hartman for followup in 1 month.      # Crohn's colitis:  - continue stelara 90 mg SC every 8 weeks  - continue MTX 15 mg po weekly and folic acid 1 mg daily- reminded pt to abstain from alcohol and 2 forms of birth control if sexually active  - start Canasa 1gm daily QHS  - pt to give us update on symptoms in 2 weeks for further instructions on whether to continue canasa, increase canasa or select alternative topical  treatment  - drug monitoring labs: TPMT (normal), TB quantiferon (normal), Hep B testing (HBsAg negative, HBtotalcoreAb negative), CBC/CMP every 3 months    # Headaches  - reported having frequent use of NSAIDs due to headaches; reminded to avoid NSAIDs due to underlying IBD  - will start low dose elavil 10 mg po daily and can uptitrate with PCP or neurologist to 25 mg po qhs if it is not fully effective; discussed and gave written information regarding SE of elavil     # IBD specific health maintenance:  Colorectal cancer risk:    Risks factors: None- average risk  - Distribution of colonic disease:  >1/3 of colon involved  - Year of symptom onset: 2015  - colonoscopy: every 1-2 years starting 2023     Pap smear recommended yearly recommended  Opthamologic exam recommended   Dermatologic exam recommended      Bone health:  Risk of osteopenia/osteoporosis:  Risks factors:none     Vitamin D deficiency: vitamin D level was  23 in January 2019.   - Recommended to take Daily vit D 1000Units     Vaccine counseling:  - No LIVE VACCINES  - list of vaccines recommended given to patient to get with PCP     Follow up: with Dr. Hartman in 1 month    Sharda Dumont MD  Gastroenterology & Hepatology Fellow  Pager: [phone]    I have reviewed and concur with the fellow's history, physical, assessment, and plan.  I have personally interviewed and examined the patient. The above note has been edited to reflect my recommendations.     Galo Aguilar MD  Department of Gastroenterology  Medical Director, Inflammatory Bowel Disease

## 2019-02-16 PROBLEM — G43.009 MIGRAINE WITHOUT AURA, NOT INTRACTABLE: Status: ACTIVE | Noted: 2019-02-16

## 2019-02-19 ENCOUNTER — TELEPHONE (OUTPATIENT)
Dept: GASTROENTEROLOGY | Facility: CLINIC | Age: 24
End: 2019-02-19

## 2019-02-19 NOTE — TELEPHONE ENCOUNTER
Received approval paperwork from [employer] for pts Canasa   [facility] number:  [id number]  Effective date:   02/12/2019 - 04/12/2019    Paperwork sent to be scanned

## 2019-05-14 ENCOUNTER — PATIENT MESSAGE (OUTPATIENT)
Dept: GASTROENTEROLOGY | Facility: CLINIC | Age: 24
End: 2019-05-14

## 2019-05-14 NOTE — TELEPHONE ENCOUNTER
Called [facility] and spoke with Abi  They have been giving pt Name brand.   I asked if they could run the generic and see what it will cost and she stated the pt has reached her maxium coverage for this medicine and it is showing she is responsible for the cost.  Pharmacist quoted $1,000

## 2020-06-30 NOTE — TELEPHONE ENCOUNTER
General Prescription Drug Coverage Auth Request Form faxed to [employer].    E-mail confirm receipt.    Include Z78.9 (Other Specified Conditions Influencing Health Status) As An Associated Diagnosis?: No